# Patient Record
Sex: FEMALE | Race: WHITE | NOT HISPANIC OR LATINO | URBAN - NONMETROPOLITAN AREA
[De-identification: names, ages, dates, MRNs, and addresses within clinical notes are randomized per-mention and may not be internally consistent; named-entity substitution may affect disease eponyms.]

---

## 2020-01-18 NOTE — PATIENT DISCUSSION
Retinal exam findings communicated to Physician managing diabetes. MD made aware, no new orders at this time, will continue to monitor.

## 2021-02-12 ENCOUNTER — IMPORTED ENCOUNTER (OUTPATIENT)
Dept: URBAN - NONMETROPOLITAN AREA CLINIC 1 | Facility: CLINIC | Age: 61
End: 2021-02-12

## 2021-02-12 PROBLEM — H52.223: Noted: 2021-02-12

## 2021-02-12 PROBLEM — H52.03: Noted: 2021-02-12

## 2021-02-12 PROCEDURE — 92004 COMPRE OPH EXAM NEW PT 1/>: CPT

## 2021-02-12 PROCEDURE — 92015 DETERMINE REFRACTIVE STATE: CPT

## 2021-05-05 ENCOUNTER — IMPORTED ENCOUNTER (OUTPATIENT)
Dept: URBAN - NONMETROPOLITAN AREA CLINIC 1 | Facility: CLINIC | Age: 61
End: 2021-05-05

## 2021-05-05 PROCEDURE — 92310 CONTACT LENS FITTING OU: CPT

## 2022-04-15 ASSESSMENT — VISUAL ACUITY
OD_SC: 20/25-2
OS_CC: J2
OS_SC: 20/30-2
OD_CC: J2

## 2022-04-15 ASSESSMENT — TONOMETRY
OS_IOP_MMHG: 14
OD_IOP_MMHG: 14

## 2022-10-21 ENCOUNTER — COMPREHENSIVE EXAM (OUTPATIENT)
Dept: RURAL CLINIC 1 | Facility: CLINIC | Age: 62
End: 2022-10-21

## 2022-10-21 DIAGNOSIS — H52.03: ICD-10-CM

## 2022-10-21 DIAGNOSIS — H52.223: ICD-10-CM

## 2022-10-21 PROCEDURE — 92014 COMPRE OPH EXAM EST PT 1/>: CPT

## 2022-10-21 PROCEDURE — 92310-E CONTACT LENS FITTING ESTABLISH PATIENT

## 2022-10-21 PROCEDURE — 92015 DETERMINE REFRACTIVE STATE: CPT

## 2022-10-21 ASSESSMENT — VISUAL ACUITY
OD_CC: 20/25
OS_CC: 20/60
OU_CC: 20/25-1
OD_CC: 20/25-2
OU_CC: 20/25
OS_CC: 20/25

## 2022-10-21 ASSESSMENT — TONOMETRY
OD_IOP_MMHG: 15
OS_IOP_MMHG: 15

## 2023-10-23 ENCOUNTER — COMPREHENSIVE EXAM (OUTPATIENT)
Dept: RURAL CLINIC 1 | Facility: CLINIC | Age: 63
End: 2023-10-23

## 2023-10-23 DIAGNOSIS — H52.03: ICD-10-CM

## 2023-10-23 DIAGNOSIS — H52.223: ICD-10-CM

## 2023-10-23 PROCEDURE — 92015 DETERMINE REFRACTIVE STATE: CPT

## 2023-10-23 PROCEDURE — 92310-E CONTACT LENS FITTING ESTABLISH PATIENT

## 2023-10-23 PROCEDURE — 92014 COMPRE OPH EXAM EST PT 1/>: CPT

## 2023-10-23 ASSESSMENT — TONOMETRY
OS_IOP_MMHG: 14
OD_IOP_MMHG: 14

## 2023-10-23 ASSESSMENT — VISUAL ACUITY
OS_SC: 20/20
OD_SC: 20/25-2

## 2024-11-18 ENCOUNTER — COMPREHENSIVE EXAM (OUTPATIENT)
Dept: RURAL CLINIC 1 | Facility: CLINIC | Age: 64
End: 2024-11-18

## 2024-11-18 DIAGNOSIS — H52.223: ICD-10-CM

## 2024-11-18 DIAGNOSIS — H52.03: ICD-10-CM

## 2024-11-18 DIAGNOSIS — H25.9: ICD-10-CM

## 2024-11-18 PROCEDURE — 92310-1 LEVEL 1 SOFT LENS UPDATE

## 2024-11-18 PROCEDURE — 92014 COMPRE OPH EXAM EST PT 1/>: CPT
